# Patient Record
Sex: FEMALE | Race: WHITE | HISPANIC OR LATINO | URBAN - METROPOLITAN AREA
[De-identification: names, ages, dates, MRNs, and addresses within clinical notes are randomized per-mention and may not be internally consistent; named-entity substitution may affect disease eponyms.]

---

## 2017-03-21 ENCOUNTER — APPOINTMENT (RX ONLY)
Dept: URBAN - METROPOLITAN AREA CLINIC 25 | Facility: CLINIC | Age: 42
Setting detail: DERMATOLOGY
End: 2017-03-21

## 2017-03-21 VITALS
HEIGHT: 64 IN | HEART RATE: 69 BPM | DIASTOLIC BLOOD PRESSURE: 73 MMHG | SYSTOLIC BLOOD PRESSURE: 111 MMHG | WEIGHT: 140 LBS

## 2017-03-21 DIAGNOSIS — L70.0 ACNE VULGARIS: ICD-10-CM

## 2017-03-21 DIAGNOSIS — Z41.9 ENCOUNTER FOR PROCEDURE FOR PURPOSES OTHER THAN REMEDYING HEALTH STATE, UNSPECIFIED: ICD-10-CM

## 2017-03-21 DIAGNOSIS — L98.8 OTHER SPECIFIED DISORDERS OF THE SKIN AND SUBCUTANEOUS TISSUE: ICD-10-CM

## 2017-03-21 DIAGNOSIS — L03.01 CELLULITIS OF FINGER: ICD-10-CM

## 2017-03-21 PROBLEM — L03.011 CELLULITIS OF RIGHT FINGER: Status: ACTIVE | Noted: 2017-03-21

## 2017-03-21 PROBLEM — L90.8 OTHER ATROPHIC DISORDERS OF SKIN: Status: ACTIVE | Noted: 2017-03-21

## 2017-03-21 PROCEDURE — ? PRESCRIPTION

## 2017-03-21 PROCEDURE — ? CHEMICAL PEEL

## 2017-03-21 PROCEDURE — 99213 OFFICE O/P EST LOW 20 MIN: CPT

## 2017-03-21 PROCEDURE — ? TREATMENT REGIMEN

## 2017-03-21 RX ORDER — TAZAROTENE 1 MG/G
CREAM CUTANEOUS
Qty: 1 | Refills: 2 | Status: ERX | COMMUNITY
Start: 2017-03-21

## 2017-03-21 RX ORDER — CLOTRIMAZOLE AND BETAMETHASONE DIPROPIONATE 10; .5 MG/G; MG/G
CREAM TOPICAL
Qty: 1 | Refills: 0 | Status: ERX | COMMUNITY
Start: 2017-03-21

## 2017-03-21 RX ORDER — CLINDAMYCIN PHOSPHATE 10 MG/G
GEL TOPICAL
Qty: 1 | Refills: 3 | Status: ERX

## 2017-03-21 RX ADMIN — TAZAROTENE: 1 CREAM CUTANEOUS at 15:22

## 2017-03-21 RX ADMIN — CLOTRIMAZOLE AND BETAMETHASONE DIPROPIONATE: 10; .5 CREAM TOPICAL at 15:08

## 2017-03-21 ASSESSMENT — LOCATION ZONE DERM
LOCATION ZONE: FINGERNAIL
LOCATION ZONE: FACE

## 2017-03-21 ASSESSMENT — LOCATION DETAILED DESCRIPTION DERM
LOCATION DETAILED: LEFT INFERIOR CENTRAL MALAR CHEEK
LOCATION DETAILED: RIGHT MIDDLE FINGERNAIL
LOCATION DETAILED: RIGHT CENTRAL MALAR CHEEK
LOCATION DETAILED: LEFT CENTRAL MALAR CHEEK
LOCATION DETAILED: RIGHT INFERIOR CENTRAL MALAR CHEEK

## 2017-03-21 ASSESSMENT — LOCATION SIMPLE DESCRIPTION DERM
LOCATION SIMPLE: LEFT CHEEK
LOCATION SIMPLE: RIGHT CHEEK
LOCATION SIMPLE: RIGHT MIDDLE FINGERNAIL

## 2017-03-21 NOTE — PROCEDURE: MIPS QUALITY
Quality 226: Preventive Care And Screening: Tobacco Use: Screening And Cessation Intervention: Patient screened for tobacco and never smoked
Quality 128: Preventive Care And Screening: Body Mass Index (Bmi) Screening And Follow-Up Plan: BMI is documented within normal parameters and no follow-up plan is required.
Quality 111:Pneumonia Vaccination Status For Older Adults: Pneumococcal Vaccination not Administered or Previously Received, Reason not Otherwise Specified
Quality 110: Preventive Care And Screening: Influenza Immunization: Influenza Immunization not Administered for Documented Reasons.
Quality 154 Part B: Falls: Risk Screening (Should Be Reported With Measure 155.): Patient screened for future fall risk; documentation of no falls in the past year or only one fall without injury in the past year
Quality 131: Pain Assessment And Follow-Up: Pain assessment using a standardized tool is documented as negative, no follow-up plan required
Quality 431: Preventive Care And Screening: Unhealthy Alcohol Use - Screening: Patient screened for unhealthy alcohol use using a single question and scores less than 2 times per year
Quality 46: Medication Reconciliation: For eligible patients only (patients seen within 30 days from discharge) Were discharged medications reconciled with the current medication list in their medication record within 30 days of discharge from the inpatient facility?
Quality 154 Part A: Falls: Risk Assessment (Should Be Reported With Measure 155.): Falls risk assessment completed and documented in the past 12 months.
Quality 400a: One-Time Screening For Hepatitis C Virus (Hcv) For All Patients: Documentation of medical reason(s) (reduced life expectancy) for not receiving one-time screening for HCV infection
Quality 47: Advance Care Plan: Advance Care Planning discussed and documented in the medical record; patient did not wish or was not able to name a surrogate decision maker or provide an advance care plan.
Quality 134: Screening For Clinical Depression And Follow-Up Plan: The patient was screened for depression and the screen was negative and no follow up required
Quality 130: Documentation Of Current Medications In The Medical Record: Current Medications Documented
Detail Level: Generalized

## 2017-03-21 NOTE — PROCEDURE: TREATMENT REGIMEN
Detail Level: Zone
Continue Regimen: Terbinafine 250 mg x 2 weeks
Plan: Discussed restylane 350 per 0.5cc syringe

## 2017-03-21 NOTE — PROCEDURE: CHEMICAL PEEL
Chemical Peel: Skin [facility] Vitalize
Treatment Number: 1
Consent: Prior to the procedure, written consent was obtained and risks were reviewed, including but not limited to: redness, peeling, blistering, pigmentary change, scarring, infection, and pain.
Detail Level: Zone
Price (Use Numbers Only, No Special Characters Or $): 175.0
Prep: The treated area was degreased with pre-peel cleanser, and vaseline was applied for protection of mucous membranes.
Post Peel Care: After the procedure, a post-peel cream was applied to the treated areas. Sun protection and post-care instructions were reviewed with the patient.
Post-Care Instructions: I reviewed with the patient in detail post-care instructions. Patient should avoid sun exposure and wear sun protection.

## 2017-10-24 ENCOUNTER — APPOINTMENT (RX ONLY)
Dept: URBAN - METROPOLITAN AREA CLINIC 86 | Facility: CLINIC | Age: 42
Setting detail: DERMATOLOGY
End: 2017-10-24

## 2017-10-24 VITALS
WEIGHT: 145 LBS | DIASTOLIC BLOOD PRESSURE: 67 MMHG | HEIGHT: 64 IN | HEART RATE: 88 BPM | SYSTOLIC BLOOD PRESSURE: 124 MMHG

## 2017-10-24 DIAGNOSIS — K13.0 DISEASES OF LIPS: ICD-10-CM

## 2017-10-24 DIAGNOSIS — L70.0 ACNE VULGARIS: ICD-10-CM

## 2017-10-24 DIAGNOSIS — L98.8 OTHER SPECIFIED DISORDERS OF THE SKIN AND SUBCUTANEOUS TISSUE: ICD-10-CM

## 2017-10-24 PROBLEM — L90.8 OTHER ATROPHIC DISORDERS OF SKIN: Status: ACTIVE | Noted: 2017-10-24

## 2017-10-24 PROCEDURE — 99213 OFFICE O/P EST LOW 20 MIN: CPT

## 2017-10-24 PROCEDURE — ? COUNSELING

## 2017-10-24 PROCEDURE — ? FILLERS

## 2017-10-24 PROCEDURE — ? PRESCRIPTION

## 2017-10-24 RX ORDER — DAPSONE 75 MG/G
GEL TOPICAL
Qty: 1 | Refills: 1 | Status: ERX | COMMUNITY
Start: 2017-10-24

## 2017-10-24 RX ORDER — TAZAROTENE 0.5 MG/G
CREAM CUTANEOUS QHS
Qty: 1 | Refills: 1 | Status: ERX | COMMUNITY
Start: 2017-10-24

## 2017-10-24 RX ORDER — MINOCYCLINE HYDROCHLORIDE 100 MG/1
CAPSULE ORAL
Qty: 60 | Refills: 0 | COMMUNITY
Start: 2017-10-24

## 2017-10-24 RX ORDER — CLOTRIMAZOLE AND BETAMETHASONE DIPROPIONATE 10; .5 MG/G; MG/G
CREAM TOPICAL
Qty: 1 | Refills: 0 | COMMUNITY
Start: 2017-10-24

## 2017-10-24 RX ADMIN — MINOCYCLINE HYDROCHLORIDE: 100 CAPSULE ORAL at 18:28

## 2017-10-24 RX ADMIN — CLOTRIMAZOLE AND BETAMETHASONE DIPROPIONATE: 10; .5 CREAM TOPICAL at 18:01

## 2017-10-24 RX ADMIN — TAZAROTENE: 0.5 CREAM CUTANEOUS at 17:58

## 2017-10-24 RX ADMIN — DAPSONE: 75 GEL TOPICAL at 17:58

## 2017-10-24 ASSESSMENT — LOCATION ZONE DERM
LOCATION ZONE: LIP
LOCATION ZONE: NOSE

## 2017-10-24 ASSESSMENT — LOCATION SIMPLE DESCRIPTION DERM
LOCATION SIMPLE: RIGHT LIP
LOCATION SIMPLE: NOSE
LOCATION SIMPLE: LEFT LIP

## 2017-10-24 ASSESSMENT — LOCATION DETAILED DESCRIPTION DERM
LOCATION DETAILED: RIGHT UPPER CUTANEOUS LIP
LOCATION DETAILED: LEFT UPPER CUTANEOUS LIP
LOCATION DETAILED: LEFT SUPERIOR VERMILION LIP
LOCATION DETAILED: NASAL DORSUM
LOCATION DETAILED: RIGHT LOWER CUTANEOUS LIP
LOCATION DETAILED: LEFT LOWER CUTANEOUS LIP

## 2017-10-24 NOTE — PROCEDURE: FILLERS
Additional Area 2 Volume In Cc: 0
Price (Use Numbers Only, No Special Characters Or $): [facility]
Expiration Date (Month Year): 2019-06-30
Consent: Written consent obtained. Risks include but not limited to bruising, beading, irregular texture, ulceration, infection, allergic reaction, scar formation, incomplete augmentation, temporary nature, procedural pain.
Map Statment: See [address] for Complete Details
Lot #: [id number]
Use Map Statement For Sites (Optional): No
Post-Care Instructions: Patient instructed to apply ice to reduce swelling.
Filler: Restylane-L
Marionette Lines Filler  Volume In Cc: 0.5
Detail Level: Detailed
Topical Anesthesia?: 20% benzocaine, 8% lidocaine, 4% tetracaine

## 2017-10-24 NOTE — PROCEDURE: MIPS QUALITY
Quality 111:Pneumonia Vaccination Status For Older Adults: Pneumococcal Vaccination Previously Received
Quality 226: Preventive Care And Screening: Tobacco Use: Screening And Cessation Intervention: Patient screened for tobacco and never smoked
Quality 47: Advance Care Plan: Advance Care Planning discussed and documented; advance care plan or surrogate decision maker documented in the medical record.
Quality 110: Preventive Care And Screening: Influenza Immunization: Influenza Immunization Administered during Influenza season
Quality 130: Documentation Of Current Medications In The Medical Record: Current Medications Documented
Detail Level: Detailed

## 2017-11-14 ENCOUNTER — RX ONLY (OUTPATIENT)
Age: 42
Setting detail: RX ONLY
End: 2017-11-14

## 2017-11-14 RX ORDER — MINOCYCLINE HYDROCHLORIDE 65 MG/1
TABLET, FILM COATED, EXTENDED RELEASE ORAL
Qty: 30 | Refills: 2 | Status: ERX | COMMUNITY
Start: 2017-11-14

## 2025-07-31 ENCOUNTER — APPOINTMENT (OUTPATIENT)
Dept: URBAN - METROPOLITAN AREA CLINIC 90 | Facility: CLINIC | Age: 50
Setting detail: DERMATOLOGY
End: 2025-07-31

## 2025-07-31 VITALS — WEIGHT: 148 LBS | HEIGHT: 64 IN

## 2025-07-31 DIAGNOSIS — L81.0 POSTINFLAMMATORY HYPERPIGMENTATION: ICD-10-CM

## 2025-07-31 DIAGNOSIS — L73.9 FOLLICULAR DISORDER, UNSPECIFIED: ICD-10-CM

## 2025-07-31 DIAGNOSIS — L81.4 OTHER MELANIN HYPERPIGMENTATION: ICD-10-CM

## 2025-07-31 DIAGNOSIS — L70.0 ACNE VULGARIS: ICD-10-CM

## 2025-07-31 DIAGNOSIS — Z80.8 FAMILY HISTORY OF MALIGNANT NEOPLASM OF OTHER ORGANS OR SYSTEMS: ICD-10-CM

## 2025-07-31 DIAGNOSIS — D22 MELANOCYTIC NEVI: ICD-10-CM

## 2025-07-31 DIAGNOSIS — Z71.89 OTHER SPECIFIED COUNSELING: ICD-10-CM

## 2025-07-31 DIAGNOSIS — L65.8 OTHER SPECIFIED NONSCARRING HAIR LOSS: ICD-10-CM

## 2025-07-31 PROBLEM — D22.72 MELANOCYTIC NEVI OF LEFT LOWER LIMB, INCLUDING HIP: Status: ACTIVE | Noted: 2025-07-31

## 2025-07-31 PROBLEM — D22.5 MELANOCYTIC NEVI OF TRUNK: Status: ACTIVE | Noted: 2025-07-31

## 2025-07-31 PROBLEM — D22.61 MELANOCYTIC NEVI OF RIGHT UPPER LIMB, INCLUDING SHOULDER: Status: ACTIVE | Noted: 2025-07-31

## 2025-07-31 PROBLEM — D23.71 OTHER BENIGN NEOPLASM OF SKIN OF RIGHT LOWER LIMB, INCLUDING HIP: Status: ACTIVE | Noted: 2025-07-31

## 2025-07-31 PROBLEM — D22.39 MELANOCYTIC NEVI OF OTHER PARTS OF FACE: Status: ACTIVE | Noted: 2025-07-31

## 2025-07-31 PROCEDURE — ? COUNSELING

## 2025-07-31 PROCEDURE — ? RECOMMENDATIONS

## 2025-07-31 PROCEDURE — ?

## 2025-07-31 PROCEDURE — ? PRESCRIPTION

## 2025-07-31 PROCEDURE — ? PRESCRIPTION MEDICATION MANAGEMENT

## 2025-07-31 RX ORDER — SPIRONOLACTONE 50 MG/1
1 TABLET, FILM COATED ORAL QD
Qty: 90 | Refills: 1 | Status: ERX | COMMUNITY
Start: 2025-07-31

## 2025-07-31 RX ORDER — TRETIONIN 0.5 MG/G
1 CREAM TOPICAL QHS
Qty: 45 | Refills: 5 | Status: ERX | COMMUNITY
Start: 2025-07-31

## 2025-07-31 RX ADMIN — SPIRONOLACTONE 1: 50 TABLET, FILM COATED ORAL at 00:00

## 2025-07-31 RX ADMIN — TRETIONIN 1: 0.5 CREAM TOPICAL at 00:00

## 2025-07-31 ASSESSMENT — LOCATION SIMPLE DESCRIPTION DERM
LOCATION SIMPLE: CHEST
LOCATION SIMPLE: RIGHT CHEEK
LOCATION SIMPLE: LEFT CHEEK
LOCATION SIMPLE: POSTERIOR SCALP
LOCATION SIMPLE: RIGHT BUTTOCK
LOCATION SIMPLE: RIGHT AXILLARY VAULT
LOCATION SIMPLE: LEFT KNEE
LOCATION SIMPLE: LEFT BUTTOCK
LOCATION SIMPLE: NECK
LOCATION SIMPLE: ABDOMEN
LOCATION SIMPLE: RIGHT UPPER BACK

## 2025-07-31 ASSESSMENT — LOCATION DETAILED DESCRIPTION DERM
LOCATION DETAILED: LEFT BUTTOCK
LOCATION DETAILED: RIGHT SUPERIOR MEDIAL UPPER BACK
LOCATION DETAILED: LEFT CENTRAL MALAR CHEEK
LOCATION DETAILED: RIGHT CENTRAL MANDIBULAR CHEEK
LOCATION DETAILED: RIGHT AXILLARY VAULT
LOCATION DETAILED: LEFT KNEE
LOCATION DETAILED: MIDDLE STERNUM
LOCATION DETAILED: RIGHT BUTTOCK
LOCATION DETAILED: PERIUMBILICAL SKIN
LOCATION DETAILED: POSTERIOR MID-PARIETAL SCALP
LOCATION DETAILED: LEFT CENTRAL LATERAL NECK
LOCATION DETAILED: RIGHT MEDIAL UPPER BACK

## 2025-07-31 ASSESSMENT — LOCATION ZONE DERM
LOCATION ZONE: FACE
LOCATION ZONE: SCALP
LOCATION ZONE: NECK
LOCATION ZONE: TRUNK
LOCATION ZONE: LEG
LOCATION ZONE: AXILLAE

## 2025-07-31 NOTE — PROCEDURE: RECOMMENDATIONS
Recommendations (Free Text): Glytone acne spray \\nBenzoyl peroxide cleanser
Detail Level: Generalized
Render Risk Assessment In Note?: no
Recommendation Preamble: The following recommendations were made during the visit:

## 2025-07-31 NOTE — HPI: EVALUATION OF SKIN LESION(S)
Hpi Title: Evaluation of Skin Lesions
Additional History: Patient reports her father having a history of BCC on the back. Patient states she ran out of spirolactone 3 months ago but would possibly like it again for her acne

## 2025-07-31 NOTE — PROCEDURE: PRESCRIPTION MEDICATION MANAGEMENT
Detail Level: Zone
Continue Regimen: spironolactone 50 mg tablet QD: Take one tablet PO QD\\n\\ntretinoin 0.05 % topical cream QHS: Apply a pea sized amount face QHS. Start twice a week then increase as tolerated. Cover with moisturizer afterwards.
Render In Strict Bullet Format?: No
Continue Regimen: spironolactone 50 mg tablet QD: Take one tablet PO QD